# Patient Record
Sex: FEMALE | URBAN - METROPOLITAN AREA
[De-identification: names, ages, dates, MRNs, and addresses within clinical notes are randomized per-mention and may not be internally consistent; named-entity substitution may affect disease eponyms.]

---

## 2020-04-30 ENCOUNTER — AMBULATORY - HEALTHEAST (OUTPATIENT)
Dept: SURGERY | Facility: CLINIC | Age: 55
End: 2020-04-30

## 2020-04-30 DIAGNOSIS — Z11.59 ENCOUNTER FOR SCREENING FOR OTHER VIRAL DISEASES: ICD-10-CM

## 2021-07-03 NOTE — ADDENDUM NOTE
Addendum Note by Arcelia Beaver, RN at 4/30/2020  1:13 PM     Author: Arcelia Beaver RN Service: -- Author Type: Registered Nurse    Filed: 5/21/2020  1:36 PM Encounter Date: 4/30/2020 Status: Signed    : Arcelia Beaver RN (Registered Nurse)    Addended by: ARCELIA BEAVER on: 5/21/2020 01:36 PM        Modules accepted: Orders

## 2021-07-03 NOTE — ADDENDUM NOTE
Addendum Note by Arcelia Beaver, RN at 4/30/2020  1:13 PM     Author: Arcelia Beaver RN Service: -- Author Type: Registered Nurse    Filed: 5/22/2020 11:45 AM Encounter Date: 4/30/2020 Status: Signed    : Arcelia Beaver RN (Registered Nurse)    Addended by: ARCELIA BEAVER on: 5/22/2020 11:45 AM        Modules accepted: Orders

## 2024-08-21 ENCOUNTER — TRANSFERRED RECORDS (OUTPATIENT)
Dept: HEALTH INFORMATION MANAGEMENT | Facility: CLINIC | Age: 59
End: 2024-08-21

## 2025-02-19 ENCOUNTER — TRANSFERRED RECORDS (OUTPATIENT)
Dept: HEALTH INFORMATION MANAGEMENT | Facility: CLINIC | Age: 60
End: 2025-02-19

## 2025-04-21 ENCOUNTER — MEDICAL CORRESPONDENCE (OUTPATIENT)
Dept: HEALTH INFORMATION MANAGEMENT | Facility: CLINIC | Age: 60
End: 2025-04-21
Payer: MEDICAID

## 2025-04-24 ENCOUNTER — TRANSCRIBE ORDERS (OUTPATIENT)
Dept: OTHER | Age: 60
End: 2025-04-24

## 2025-04-24 DIAGNOSIS — B20 HUMAN IMMUNODEFICIENCY VIRUS (HIV) DISEASE (H): Primary | ICD-10-CM

## 2025-05-06 ENCOUNTER — TRANSCRIBE ORDERS (OUTPATIENT)
Dept: OTHER | Age: 60
End: 2025-05-06

## 2025-05-06 DIAGNOSIS — B20 HUMAN IMMUNODEFICIENCY VIRUS (HIV) DISEASE (H): Primary | ICD-10-CM

## 2025-05-29 ENCOUNTER — TELEPHONE (OUTPATIENT)
Dept: INFECTIOUS DISEASES | Facility: CLINIC | Age: 60
End: 2025-05-29
Payer: MEDICAID

## 2025-05-29 DIAGNOSIS — B20 HUMAN IMMUNODEFICIENCY VIRUS (H): Primary | ICD-10-CM

## 2025-05-29 NOTE — TELEPHONE ENCOUNTER
Entered orders for patient to schedule lab appointment prior to OV. Sent scheduling request to schedulers.

## 2025-06-03 ENCOUNTER — TELEPHONE (OUTPATIENT)
Dept: INFECTIOUS DISEASES | Facility: CLINIC | Age: 60
End: 2025-06-03
Payer: MEDICAID

## 2025-06-03 NOTE — TELEPHONE ENCOUNTER
JOSSELINE [facility] 6/3 to sched a NEW B20 visit with any B20 ID provider with labs 1 week prior to appt. Referral from Dr. Delmis Bravo MD.

## 2025-06-04 ENCOUNTER — TELEPHONE (OUTPATIENT)
Dept: INFECTIOUS DISEASES | Facility: CLINIC | Age: 60
End: 2025-06-04
Payer: MEDICAID

## 2025-06-04 NOTE — TELEPHONE ENCOUNTER
EP LVM 6/4 to sched a NEW B20 visit with any B20 ID provider with labs 1 week prior to appt. Referral from Dr. Delmis Bravo MD. --2nd attempt.

## 2025-06-29 ENCOUNTER — HEALTH MAINTENANCE LETTER (OUTPATIENT)
Age: 60
End: 2025-06-29

## 2025-07-02 ENCOUNTER — LAB (OUTPATIENT)
Dept: LAB | Facility: CLINIC | Age: 60
End: 2025-07-02
Payer: COMMERCIAL

## 2025-07-02 DIAGNOSIS — B20 HUMAN IMMUNODEFICIENCY VIRUS (H): ICD-10-CM

## 2025-07-02 LAB
ALBUMIN SERPL BCG-MCNC: 4 G/DL (ref 3.5–5.2)
ALP SERPL-CCNC: 103 U/L (ref 40–150)
ALT SERPL W P-5'-P-CCNC: 25 U/L (ref 0–50)
ANION GAP SERPL CALCULATED.3IONS-SCNC: 10 MMOL/L (ref 7–15)
AST SERPL W P-5'-P-CCNC: 26 U/L (ref 0–45)
BASOPHILS # BLD AUTO: 0 10E3/UL (ref 0–0.2)
BASOPHILS NFR BLD AUTO: 0 %
BILIRUB SERPL-MCNC: 0.2 MG/DL
BUN SERPL-MCNC: 17.5 MG/DL (ref 6–20)
CALCIUM SERPL-MCNC: 9.6 MG/DL (ref 8.8–10.4)
CD3 CELLS # BLD: 1832 CELLS/UL (ref 603–2990)
CD3 CELLS NFR BLD: 58 % (ref 49–84)
CD3+CD4+ CELLS # BLD: 1131 CELLS/UL (ref 441–2156)
CD3+CD4+ CELLS NFR BLD: 36 % (ref 28–63)
CD3+CD4+ CELLS/CD3+CD8+ CLL BLD: 1.69 % (ref 1.4–2.6)
CD3+CD8+ CELLS # BLD: 671 CELLS/UL (ref 125–1312)
CD3+CD8+ CELLS NFR BLD: 21 % (ref 10–40)
CHLORIDE SERPL-SCNC: 102 MMOL/L (ref 98–107)
CREAT SERPL-MCNC: 0.65 MG/DL (ref 0.51–0.95)
EGFRCR SERPLBLD CKD-EPI 2021: >90 ML/MIN/1.73M2
EOSINOPHIL # BLD AUTO: 0.2 10E3/UL (ref 0–0.7)
EOSINOPHIL NFR BLD AUTO: 2 %
ERYTHROCYTE [DISTWIDTH] IN BLOOD BY AUTOMATED COUNT: 13.8 % (ref 10–15)
GLUCOSE SERPL-MCNC: 73 MG/DL (ref 70–99)
HCO3 SERPL-SCNC: 27 MMOL/L (ref 22–29)
HCT VFR BLD AUTO: 39.2 % (ref 35–47)
HGB BLD-MCNC: 12.9 G/DL (ref 11.7–15.7)
IMM GRANULOCYTES # BLD: 0 10E3/UL
IMM GRANULOCYTES NFR BLD: 0 %
LYMPHOCYTES # BLD AUTO: 2.8 10E3/UL (ref 0.8–5.3)
LYMPHOCYTES NFR BLD AUTO: 30 %
MCH RBC QN AUTO: 31.8 PG (ref 26.5–33)
MCHC RBC AUTO-ENTMCNC: 32.9 G/DL (ref 31.5–36.5)
MCV RBC AUTO: 97 FL (ref 78–100)
MEV IGG SER IA-ACNC: 39.3 AU/ML
MEV IGG SER IA-ACNC: POSITIVE
MONOCYTES # BLD AUTO: 1.2 10E3/UL (ref 0–1.3)
MONOCYTES NFR BLD AUTO: 12 %
NEUTROPHILS # BLD AUTO: 5.2 10E3/UL (ref 1.6–8.3)
NEUTROPHILS NFR BLD AUTO: 56 %
PLATELET # BLD AUTO: 411 10E3/UL (ref 150–450)
POTASSIUM SERPL-SCNC: 4.5 MMOL/L (ref 3.4–5.3)
PROT SERPL-MCNC: 8.3 G/DL (ref 6.4–8.3)
RBC # BLD AUTO: 4.06 10E6/UL (ref 3.8–5.2)
RUBV IGG SERPL QL IA: 0.12 INDEX
RUBV IGG SERPL QL IA: NORMAL
SODIUM SERPL-SCNC: 139 MMOL/L (ref 135–145)
T CELL COMMENT: NORMAL
T PALLIDUM AB SER QL: NONREACTIVE
WBC # BLD AUTO: 9.4 10E3/UL (ref 4–11)

## 2025-07-02 PROCEDURE — 85025 COMPLETE CBC W/AUTO DIFF WBC: CPT

## 2025-07-02 PROCEDURE — 86762 RUBELLA ANTIBODY: CPT

## 2025-07-02 PROCEDURE — 86709 HEPATITIS A IGM ANTIBODY: CPT

## 2025-07-02 PROCEDURE — 86359 T CELLS TOTAL COUNT: CPT

## 2025-07-02 PROCEDURE — 80053 COMPREHEN METABOLIC PANEL: CPT

## 2025-07-02 PROCEDURE — 87591 N.GONORRHOEAE DNA AMP PROB: CPT

## 2025-07-02 PROCEDURE — 86644 CMV ANTIBODY: CPT

## 2025-07-02 PROCEDURE — 86696 HERPES SIMPLEX TYPE 2 TEST: CPT

## 2025-07-02 PROCEDURE — 86704 HEP B CORE ANTIBODY TOTAL: CPT

## 2025-07-02 PROCEDURE — 86645 CMV ANTIBODY IGM: CPT

## 2025-07-02 PROCEDURE — 87491 CHLMYD TRACH DNA AMP PROBE: CPT | Mod: 59

## 2025-07-02 PROCEDURE — 87340 HEPATITIS B SURFACE AG IA: CPT

## 2025-07-02 PROCEDURE — 87536 HIV-1 QUANT&REVRSE TRNSCRPJ: CPT

## 2025-07-02 PROCEDURE — 86708 HEPATITIS A ANTIBODY: CPT

## 2025-07-02 PROCEDURE — 36415 COLL VENOUS BLD VENIPUNCTURE: CPT

## 2025-07-02 PROCEDURE — 86765 RUBEOLA ANTIBODY: CPT

## 2025-07-02 PROCEDURE — 86735 MUMPS ANTIBODY: CPT

## 2025-07-02 PROCEDURE — 86360 T CELL ABSOLUTE COUNT/RATIO: CPT

## 2025-07-02 PROCEDURE — 86695 HERPES SIMPLEX TYPE 1 TEST: CPT

## 2025-07-02 PROCEDURE — 86780 TREPONEMA PALLIDUM: CPT

## 2025-07-03 LAB
C TRACH DNA SPEC QL PROBE+SIG AMP: NEGATIVE
C TRACH DNA SPEC QL PROBE+SIG AMP: NEGATIVE
CMV IGG SERPL IA-ACNC: >10 U/ML
CMV IGG SERPL IA-ACNC: ABNORMAL
CMV IGM SERPL IA-ACNC: 11.7 AU/ML
CMV IGM SERPL IA-ACNC: NEGATIVE
HAV AB SER QL IA: REACTIVE
HAV IGM SERPL QL IA: NONREACTIVE
HBV CORE AB SERPL QL IA: NONREACTIVE
HBV SURFACE AG SERPL QL IA: NONREACTIVE
HIV1 RNA # PLAS NAA DL=20: NOT DETECTED COPIES/ML
HSV1 IGG SERPL QL IA: 9.87 INDEX
HSV1 IGG SERPL QL IA: ABNORMAL
HSV2 IGG SERPL QL IA: 10.6 INDEX
HSV2 IGG SERPL QL IA: ABNORMAL
MUMPS ANTIBODY IGG INSTRUMENT VALUE: 25.1 AU/ML
MUV IGG SER QL IA: POSITIVE
N GONORRHOEA DNA SPEC QL NAA+PROBE: NEGATIVE
N GONORRHOEA DNA SPEC QL NAA+PROBE: NEGATIVE
SPECIMEN TYPE: NORMAL
SPECIMEN TYPE: NORMAL

## 2025-07-07 ENCOUNTER — TELEPHONE (OUTPATIENT)
Dept: INFECTIOUS DISEASES | Facility: CLINIC | Age: 60
End: 2025-07-07
Payer: COMMERCIAL

## 2025-07-07 NOTE — TELEPHONE ENCOUNTER
EP LVM 7/7 to let pt know that the 7/9 appt in [place] is cancelled and switched to [facility]; appt needed to be in [place].

## 2025-07-07 NOTE — CONFIDENTIAL NOTE
DIAGNOSIS:  Human immunodeficiency virus (HIV) disease    DATE RECEIVED:  07.09.2025    NOTES (Gather within 2 years) STATUS DETAILS   OFFICE NOTE from referring provider   Received 05.09.2025   Delmis Bravo APRN [place] [facility]    LABS (any labs) Internal / CE

## 2025-07-09 ENCOUNTER — PATIENT OUTREACH (OUTPATIENT)
Dept: INFECTIOUS DISEASES | Facility: CLINIC | Age: 60
End: 2025-07-09

## 2025-07-09 ENCOUNTER — PRE VISIT (OUTPATIENT)
Dept: INFECTIOUS DISEASES | Facility: CLINIC | Age: 60
End: 2025-07-09

## 2025-07-09 ENCOUNTER — OFFICE VISIT (OUTPATIENT)
Dept: INFECTIOUS DISEASES | Facility: CLINIC | Age: 60
End: 2025-07-09
Attending: STUDENT IN AN ORGANIZED HEALTH CARE EDUCATION/TRAINING PROGRAM
Payer: COMMERCIAL

## 2025-07-09 VITALS
OXYGEN SATURATION: 95 % | HEART RATE: 71 BPM | WEIGHT: 293 LBS | DIASTOLIC BLOOD PRESSURE: 84 MMHG | SYSTOLIC BLOOD PRESSURE: 135 MMHG

## 2025-07-09 DIAGNOSIS — E66.9 OBESITY, UNSPECIFIED CLASS, UNSPECIFIED OBESITY TYPE, UNSPECIFIED WHETHER SERIOUS COMORBIDITY PRESENT: ICD-10-CM

## 2025-07-09 DIAGNOSIS — B20 HUMAN IMMUNODEFICIENCY VIRUS (HIV) DISEASE (H): Primary | ICD-10-CM

## 2025-07-09 DIAGNOSIS — Z23 NEED FOR VACCINATION: ICD-10-CM

## 2025-07-09 PROCEDURE — 90750 HZV VACC RECOMBINANT IM: CPT | Performed by: INTERNAL MEDICINE

## 2025-07-09 PROCEDURE — 90471 IMMUNIZATION ADMIN: CPT | Performed by: INTERNAL MEDICINE

## 2025-07-09 PROCEDURE — 99213 OFFICE O/P EST LOW 20 MIN: CPT | Performed by: STUDENT IN AN ORGANIZED HEALTH CARE EDUCATION/TRAINING PROGRAM

## 2025-07-09 PROCEDURE — 250N000021 HC RX MED A9270 GY (STAT IND- M) 250: Performed by: INTERNAL MEDICINE

## 2025-07-09 RX ORDER — ELVITEGRAVIR, COBICISTAT, EMTRICITABINE, AND TENOFOVIR ALAFENAMIDE 150; 150; 200; 10 MG/1; MG/1; MG/1; MG/1
1 TABLET ORAL DAILY
Qty: 30 TABLET | Refills: 5 | Status: SHIPPED | OUTPATIENT
Start: 2025-07-09

## 2025-07-09 RX ORDER — ELVITEGRAVIR, COBICISTAT, EMTRICITABINE, AND TENOFOVIR ALAFENAMIDE 150; 150; 200; 10 MG/1; MG/1; MG/1; MG/1
1 TABLET ORAL DAILY
COMMUNITY
Start: 2025-05-28 | End: 2025-07-09

## 2025-07-09 RX ADMIN — ZOSTER VACCINE RECOMBINANT, ADJUVANTED 0.5 ML: KIT at 16:23

## 2025-07-09 ASSESSMENT — PAIN SCALES - GENERAL: PAINLEVEL_OUTOF10: NO PAIN (0)

## 2025-07-09 NOTE — PROGRESS NOTES
Infectious Disease / [facility]    New Outpatient Visit Consultation Note   Patient:  Cinda Morrison, Date of birth 1965, Medical record number [id number]     Assessment and Plan:     Impression: HIV well controlled on current regimen of Genvoya (for well more than a decade), nor other acute health concerns.  Would prefer to have her HIV followed at the [facility] going forward.    Plan today:   Continue current Genvoya ART, refills given for the next six months (to her [facility] pharmacy).  She will plan to follow-up with the [facility] group at the [facility] in about four months to further her ongoing HIV care into the future, because that is a much more accessible location for her (eight blocks from her home, and she does not drive) instead of having to come all the way to the [facility] for care.  I told her that if immediate concerns arise before her first intake appointment at [facility], she should feel free to contact our clinic.  Vaccine given today:   Second Shingrix dose.  Consider receiving a Prevnar 20 vaccination in the future -- received a Pneumovax 23 on 6/12/23.   Needs routine HCV screening in the future.  Consider a referral to [facility] in the future to further address her face rash.  Overweight status not addressed today.    Follow up:  Four months in [place].  (Scheduled today, and she was also given the phone number for that clinic.)    Signed:  Manish Donnelly MD, 07/09/2025     Discussed with ID attending Dr. Johns    [facility] Staff Attestation Note: Ms. Morrison was seen, examined, and the case was discussed with Dr. Donnelly, ID Fellow -- I agree with his consultative history and examination, assessment and plan in this outpatient ID / HIV Consult note. This note reflects my  observations and opinions and the plan outlined fully reflects my approach. I have reviewed the available history, radiology, laboratory results, and reports with the Fellow.        Discussion:     # HIV- 1 Infection  HIV  Aquired: 1996, diagnosed in 1998.  Medication History: Genvoya (elvitegravir-cobicistat-emtricitabine-tenofovir alafenamide )  Prior Resistance Mutations: None -- genotype pan-wildtype on 10/30/2011.    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  Noted she was off HIV therapy from 2002- 2011 (diagnosed 1998, on therapy until 2002, off therapy until 2011, on therapy consistently since 2011)    Co-morbid Infections  Hep B: No, needs Hep B  Hep C: No screen found.  TB Screening:Negative Quant Gold 7/2/25  CMV IgG positive, HSV1 and 2 IgG +  Mumps immune, rubella nonimmune, measles immune  Toxoplasma:  Screening not available.    Today's Plan:  - Continue ART, monitor labs as above  - Doing well on therapy, no changes.    # Preventative Medicine  Sexually Transmitted Infection Risk and Screening: Negative STI testing 7/2/2025                       Gonorrhea and Chlamydia: - as above                       Syphilis: negative 7/2/2025     Immunizations:  COVID-19: up to date  Influenza:Discussed Seasonal Vaccine  HPV: aged out  Hepatitis B:  Hep B given in 2014 for 3 doses  Tdap: up to date  PCV 20 Received Pneumovax 23 on 6/12/23 following prior Prevnar 13 -- would recommend Prevnar 20 in the future, to be complete.  Meningococcus: Men ACYW given 9/2/20  Hepatitis A: seropositve    Renal Health: Monitor periodically to ensure no need to change regimen   Creatinine   Date Value Ref Range Status   07/02/2025 0.65 0.51 - 0.95 mg/dL Final         HPI:      Cinda Morrison is a very pleasant 59 year old patient seeing me today primarily  for transfer of stable HIV care.  She was hospitalized with AIDS at [facility] in 2011 and has (per her report) been on Genvoya since then with consistently undetectable HIV plasma viral loads, followed by Dr. Harris at [facility] ([facility]) for the past 13+ years.  She last was seen there on 2/19/25 and had routine HIV monitoring laboratory studies in 10/2024.  That HIV care program at [facility] is now being phased out and patients are being referred to other clinics in the [facility].  She was referred here to the [facility] for ongoing care, but she says she lives about eight blocks from [facility] in [place] and does not own a car, so, for ease of transportation, she would much rather have her ongoing care at the [facility] (which is staffed by ID physicians of [facility]).  She tells me she has been very consistent in taking Genvoya daily in recent years and tolerated it without perceived side effects.  She has had a mild bilateral facial rash in recent years for which she was prescribed a topical cream, but she found that the prescription was not beneficial and the rash is well-controlled with application of OTC moisturizing lotions alone.  Beyond that, she says she has felt stable well in recent months and does not believe she has any other current health issues.  She is overweight, but did not raise that as a concern today -- she has been on Wegovy in the recent past.  She had a significantly unpleasant bout of right T ~ 12 dermatomal shingles about four years ago and would like to not experience that again, so she would like to receive the second dose of her Shingrix series today.  She has bilateral (left >> right) chronic knee arthralgias that impede her ambulation.  Beyond that, she lacks other complaints today, including no recent febrile or EENT symptoms, cough, dyspnea,  chest pain, GI or  symptoms, skin concerns, or neurological symptoms.       Other Medical History:     An attempt was made to collect past medical surgical, family and social history during this encounter,  this information was reviewed with the patient and updated, documented where most relevant in the HPI    See the 2/19/25 [facility] progress note by Dr. Harris in the Media section.       Physical Exam:   Vital signs:  /84   Pulse 71   Wt (!) 149.2 kg (329 lb)   SpO2 95%   General:  A pleasant, conversant, obese, WDWN 60 yo woman in NAD.  Eyes:     Extraocular eye movements grossly intact, no ptosis, no discharge, no scleral icterus.  Mouth, Throat:     Mucous membranes moist  Cardiovascular:    S1, S2 normal, regular rate and rhythm.  Respiratory:     Inspection: Not in respiratory distress, chest expansion symmetrical.   Auscultation: 4 point auscultation done clear to auscultation bilaterally  Gastrointestinal:  Soft, non-tender; bowel sounds normal; no palpable masses.  Musculoskeletal:     No major deformity  Neurologic:     Higher Mental Function: Conversant, AOx4   Motor: Moving all 4 limbs, walks haltingly with aid of a cane.  Psychiatric: Appropriate  Skin:  Anicteric     MDM     No visits with results within 1 Week(s) from this visit.   Latest known visit with results is:   Lab on 07/02/2025   Component Date Value Ref Range Status    CMV [facility] IgG Instrument Value 07/02/2025 >10.00 (H)  <0.60 U/mL Final    CMV Antibody IgG 07/02/2025 Positive, suggests recent or past exposure. (A)  No detectable antibody.  Final    CMV [facility] IgM Instrument Value 07/02/2025 11.7  <30.0 AU/mL Final    CMV Antibody IgM 07/02/2025 Negative  Negative Final    Sodium 07/02/2025 139  135 - 145 mmol/L Final    Potassium 07/02/2025 4.5  3.4 - 5.3 mmol/L Final    Carbon Dioxide (CO2) 07/02/2025 27  22 - 29 mmol/L Final    Anion Gap 07/02/2025 10  7 - 15 mmol/L Final    Urea Nitrogen 07/02/2025 17.5  6.0 - 20.0  mg/dL Final    Creatinine 07/02/2025 0.65  0.51 - 0.95 mg/dL Final    GFR Estimate 07/02/2025 >90  >60 mL/min/1.73m2 Final    eGFR calculated using 2021 CKD-EPI equation.    Calcium 07/02/2025 9.6  8.8 - 10.4 mg/dL Final    Chloride 07/02/2025 102  98 - 107 mmol/L Final    Glucose 07/02/2025 73  70 - 99 mg/dL Final    Alkaline Phosphatase 07/02/2025 103  40 - 150 U/L Final    AST 07/02/2025 26  0 - 45 U/L Final    ALT 07/02/2025 25  0 - 50 U/L Final    Protein Total 07/02/2025 8.3  6.4 - 8.3 g/dL Final    Albumin 07/02/2025 4.0  3.5 - 5.2 g/dL Final    Bilirubin Total 07/02/2025 0.2  <=1.2 mg/dL Final    Hepatitis A Antibody IgM 07/02/2025 Nonreactive  Nonreactive Final    Nonreactive results indicate either inadequate or delayed anti-HAV IgM response after known exposure to HAV or absence of acute or recent hepatitis A.    Assay performance characteristics have not been established for immunocompromised or immunosuppressed patients.    Hepatitis A Antibody Total 07/02/2025 Reactive   Final    This assay detects the presence of anti-hepatitis A virus (anti-HAV) total (both IgG and IgM combined). If clinically indicated, specific testing for anti-HAV IgM (HAVM / Hepatitis A IgM Antibody, Serum) is necessary to confirm the presence of acute or recent hepatitis A. Please see interpretation guide below.    Assay performance characteristics have not been established for immunocompromised or immunosuppressed patients.    Hepatitis B Core Antibody Total 07/02/2025 Nonreactive  Nonreactive Final    Nonreactive hepatitis B core antibody test results indicate the absence of exposure to hepatitis B virus and no evidence of recent, past/resolved, or chronic hepatitis B.     Assay performance characteristics have not been established in patients under 21, pregnant women, or in populations of immunocompromised or immunosuppressed patients.    Hepatitis B Surface Antigen 07/02/2025 Nonreactive  Nonreactive Final      Assay  performance characteristics have not been established for testing of newborns.    HSV Type 1 IgG Instrument Value 07/02/2025 9.87 (H)  <0.90 Index Final    Herpes Simplex Virus Type 1 IgG An* 07/02/2025 Positive.  IgG antibody to HSV-1 detected. (A)  No HSV-1 IgG antibodies detected Final    HSV Type 2 IgG Instrument Value 07/02/2025 10.60 (H)  <0.90 Index Final    Herpes Simplex Virus Type 2 IgG An* 07/02/2025 Positive.  IgG antibody to HSV-2 detected. (A)  No HSV-2 IgG antibodies detected Final    HIV-1 RNA copies/mL 07/02/2025 Not Detected  Not Detected Copies/mL Final    Mumps [facility] IgG Instrument Value 07/02/2025 25.1  <9.0 AU/mL Final    Mumps Antibody IgG 07/02/2025 Positive   Final    Suggests previous exposure or immunization and probable immunity.    Rubella [facility] IgG Instrument Value 07/02/2025 0.12  <0.90 Index Final    Rubella Antibody IgG 07/02/2025 No detectable antibody.   Final    Rubeola (Measles) Emer[facility] IgG Instrume* 07/02/2025 39.3  <13.5 AU/mL Final    Rubeola (Measles) Antibody IgG 07/02/2025 Positive   Final    Suggests previous exposure or immunization and probable immunity.    CD3% Total T Cells 07/02/2025 58  49 - 84 % Final    Absolute CD3, Total T Cells 07/02/2025 1,832  603 - 2,990 cells/uL Final    CD4% [place] T Cells 07/02/2025 36  28 - 63 % Final    Absolute CD4, [place] T Cells 07/02/2025 1,131  441 - 2,156 cells/uL Final    CD8% Suppressor T Cells 07/02/2025 21  10 - 40 % Final    Absolute CD8, Suppressor T Cells 07/02/2025 671  125 - 1,312 cells/uL Final    CD4:CD8 Ratio 07/02/2025 1.69  1.40 - 2.60 Final    Treponema Antibody Total 07/02/2025 Nonreactive  Nonreactive Final    WBC Count 07/02/2025 9.4  4.0 - 11.0 10e3/uL Final    RBC Count 07/02/2025 4.06  3.80 - 5.20 10e6/uL Final    Hemoglobin 07/02/2025 12.9  11.7 - 15.7 g/dL Final    Hematocrit 07/02/2025 39.2  35.0 - 47.0 % Final    MCV 07/02/2025 97  78 - 100 fL Final    MCH 07/02/2025 31.8  26.5 - 33.0 pg Final    MCHC 07/02/2025  32.9  31.5 - 36.5 g/dL Final    RDW 07/02/2025 13.8  10.0 - 15.0 % Final    Platelet Count 07/02/2025 411  150 - 450 10e3/uL Final    % Neutrophils 07/02/2025 56  % Final    % Lymphocytes 07/02/2025 30  % Final    % Monocytes 07/02/2025 12  % Final    % Eosinophils 07/02/2025 2  % Final    % Basophils 07/02/2025 0  % Final    % Immature Granulocytes 07/02/2025 0  % Final    Absolute Neutrophils 07/02/2025 5.2  1.6 - 8.3 10e3/uL Final    Absolute Lymphocytes 07/02/2025 2.8  0.8 - 5.3 10e3/uL Final    Absolute Monocytes 07/02/2025 1.2  0.0 - 1.3 10e3/uL Final    Absolute Eosinophils 07/02/2025 0.2  0.0 - 0.7 10e3/uL Final    Absolute Basophils 07/02/2025 0.0  0.0 - 0.2 10e3/uL Final    Absolute Immature Granulocytes 07/02/2025 0.0  <=0.4 10e3/uL Final    Chlamydia Trachomatis 07/02/2025 Negative  Negative Final    Negative for C. trachomatis rRNA by transcription mediated amplification.   A negative result by transcription mediated amplification does not preclude the presence of infection because results are dependent on proper and adequate collection, absence of inhibitors and sufficient rRNA to be detected.    Neisseria gonorrhoeae 07/02/2025 Negative  Negative Final    Negative for N. gonorrhoeae rRNA by transcription mediated amplification. A negative result by transcription mediated amplification does not preclude the presence of C. trachomatis infection because results are dependent on proper and adequate collection, absence of inhibitors and sufficient rRNA to be detected.    CTNG Specimen Source 07/02/2025 Urine, Voided   Final    Chlamydia Trachomatis 07/02/2025 Negative  Negative Final    Negative for C. trachomatis rRNA by transcription mediated amplification.   A negative result by transcription mediated amplification does not preclude the presence of infection because results are dependent on proper and adequate collection, absence of inhibitors and sufficient rRNA to be detected.    Neisseria gonorrhoeae  07/02/2025 Negative  Negative Final    Negative for N. gonorrhoeae rRNA by transcription mediated amplification. A negative result by transcription mediated amplification does not preclude the presence of C. trachomatis infection because results are dependent on proper and adequate collection, absence of inhibitors and sufficient rRNA to be detected.    CTNG Specimen Source 07/02/2025 Throat   Final    Quantiferon Nil Tube 07/02/2025 0.22  IU/mL Final    Quantiferon TB1 Tube 07/02/2025 0.38  IU/mL Final    Quantiferon TB2 Tube 07/02/2025 0.26   Final    Quantiferon Mitogen 07/02/2025 10.00  IU/mL Final    Quantiferon-TB Gold Plus 07/02/2025 Negative  Negative Final    No interferon gamma response to M.tuberculosis antigens was detected. Infection with M.tuberculosis is unlikely, however a single negative result does not exclude infection. In patients at high risk for infection, a second test should be considered in accordance with the 2017 ATS/IDSA/CDC Clinical Pract  ice Guidelines for Diagnosis of Tuberculosis in Adults and Children     TB1 Ag minus Nil Value 07/02/2025 0.16  IU/mL Final    TB2 Ag minus Nil Value 07/02/2025 0.04  IU/mL Final    Mitogen minus Nil Result 07/02/2025 9.78  IU/mL Final    Nil Result 07/02/2025 0.22  IU/mL Final

## 2025-07-09 NOTE — NURSING NOTE
Chief Complaint   Patient presents with    Consult       Vital signs:      BP: 135/84 Pulse: 71     SpO2: 95 %       Weight: (!) 149.2 kg (329 lb)  There is no height or weight on file to calculate BMI.      Sandra Castrejon CMA   7/9/2025 3:40 PM

## 2025-07-09 NOTE — Clinical Note
"7/9/2025       RE: Cinda Morrison  [address]  [place] [address]     Dear Colleague,    Thank you for referring your patient, Cinda Morrison, to the [facility] [place] at [facility]. Please see a copy of my visit note below.       Infectious Disease     Outpatient Visit Note   Patient:  Cinda Morrison, Date of birth 1965, Medical record number [id number]     Assessment and Plan:     Impression: HIV well controlled on current regimen, other health maintenance addressed    Plan today:   Continue current ART, refills given  ***  Vaccines discussed: Prevnar 20,     Follow up ***    Signed:  Manish Donnelly MD, 07/09/2025     Discussed with ID attending Dr. Johns       Discussion:     # HIV- 1 Infection  HIV  Aquired: 1996, diagnosed in 1998.   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  Noted she was off HIV therapy since 2002 at that time (diagnosed 1998, on therapy until 2002, off therapy until 2011, presumably on therapy since 2011***)    Co-morbid Infections  Hep B: No, needs Hep B  Hep C: {Blank single:66872::\"No\",\"Yes\",\"prior treatment\"}  TB Screening:Negative Quant Gold 7/2/25  CMV IgG positive, HSV1 and 2 IgG +  Mumps immune, rubella nonimmune, measles immune    Today's Plan:  - Continue ART, monitor labs as above  - Doing well on therapy, no changes.    # Preventative Medicine  Sexually Transmitted Infection Risk and Screening: Negative STI testing 7/2/2025                       Gonorrhea and Chlamydia: - as above                       Syphilis: negative 7/2/2025     Immunizations:  COVID-19: up " "to date  Influenza:Discussed Seasonal Vaccine  HPV: aged out  Hepatitis B:  Hep B given in 2014 for 3 doses  Tdap: up to date  PCV 20 {Blank single:19161::\"due\",\"up to date\",\" recommended, will start today\",\"recommended, declined\",\"patient wished to address at a later time\"}  Meningococcus: Men ACYW given 9/2/20  Hepatitis A: seropositve    Renal Health: Monitor periodically to ensure no need to change regimen   Creatinine   Date Value Ref Range Status   07/02/2025 0.65 0.51 - 0.95 mg/dL Final           HPI:      Cinda Morrison is a 59 year old patient seeing me today primarily for HIV care.    ***       Other Medical History:     An attempt was made to collect past medical surgical, family and social history during this encounter,  this information was reviewed with the patient and updated, documented where most relevant in the HPI       Physical Exam:     There were no vitals taken for this visit.    GENERAL APPEARANCE: Not in acute distress    PHYSICAL EXAM:  Eyes:     Extraocular eye movements grossly intact, no ptosis, no discharge, no scleral icterus.  Mouth, Throat:     Mucous membranes moist  Cardiovascular:    S1, S2 normal, regular rate and rhythm.  Respiratory:     Inspection: Not in respiratory distress, chest expansion symmetrical.   Auscultation: 4 point auscultation done clear to auscultation bilaterally  Gastrointestinal:  Soft, non-tender; bowel sounds normal; no palpable masses.  Musculoskeletal:     No major deformity  Neurologic:     Higher Mental Function: Conversant, AOx4   Motor: Moving all 4 limbs  Psychiatric: Appropriate  Skin:  Anicteric     MDM   LABS  Absolute CD4, [place] T Cells   Date Value Ref Range Status   07/02/2025 1,131 441 - 2,156 cells/uL Final     HIV-1 RNA copies/mL   Date Value Ref Range Status   07/02/2025 Not Detected Not Detected Copies/mL Final     Treponema Antibody Total   Date Value Ref Range Status   07/02/2025 Nonreactive Nonreactive Final     Hepatitis B Surface " Antigen   Date Value Ref Range Status   07/02/2025 Nonreactive Nonreactive Final     Comment:       Assay performance characteristics have not been established for testing of newborns.        Manish Donnelly MD                Infectious Disease / [facility] (HIV) [facility]    New Outpatient Visit Consultation Note   Patient:  Cinda Morrison, Date of birth 1965, Medical record number [id number]     Assessment and Plan:     Impression: HIV well controlled on current regimen of Genvoya (for well more than a decade), nor other acute health concerns.  Would prefer to have her HIV followed at the [facility] going forward.    Plan today:   Continue current Genvoya ART, refills given for the next six months (to her [facility] pharmacy).  She will plan to follow-up with the [facility] at the [facility] in about four months to further her ongoing HIV care into the future, because that is a much more accessible location for her (eight blocks from her home, and she does not drive) instead of having to come all the way to the [facility] for care.  I told her that if immediate concerns arise before her first intake appointment at [facility], she should feel free to contact our clinic.  Vaccine given today:   Second Shingrix dose.  Consider receiving a Prevnar 20 vaccination in the future -- received a Pneumovax 23 on 6/12/23.   Needs routine HCV screening in the future.  Overweight status not addressed today.    Follow up:  Four months in [place].  (Scheduled today, and she was also given the phone number for that clinic.)    Signed:  Manish Donnelly MD, 07/09/2025     Discussed with ID attending Dr. Johns    [facility] Staff Attestation Note: Ms. Morrison was seen, examined, and the case was discussed with Dr. Donnelly, ID Fellow -- I agree with his consultative  history and examination, assessment and plan in this outpatient ID / HIV Consult note. This note reflects my observations and opinions and the plan outlined fully reflects my approach. I have reviewed the available history, radiology, laboratory results, and reports with the Fellow.        Discussion:     # HIV- 1 Infection  HIV  Aquired: 1996, diagnosed in 1998.  Medication History: Genvoya (elvitegravir-cobicistat-emtricitabine-tenofovir alafenamide )  Prior Resistance Mutations: None -- genotype pan-wildtype on 10/30/2011.    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  Noted she was off HIV therapy from 2002- 2011 (diagnosed 1998, on therapy until 2002, off therapy until 2011, on therapy consistently since 2011)    Co-morbid Infections  Hep B: No, needs Hep B  Hep C: No screen found.  TB Screening:Negative Quant Gold 7/2/25  CMV IgG positive, HSV1 and 2 IgG +  Mumps immune, rubella nonimmune, measles immune  Toxoplasma:  Screening not available.    Today's Plan:  - Continue ART, monitor labs as above  - Doing well on therapy, no changes.    # Preventative Medicine  Sexually Transmitted Infection Risk and Screening: Negative STI testing 7/2/2025                       Gonorrhea and Chlamydia: - as above                       Syphilis: negative 7/2/2025     Immunizations:  COVID-19: up to date  Influenza:Discussed Seasonal Vaccine  HPV: aged out  Hepatitis B:  Hep B given in 2014 for 3 doses  Tdap: up to date  PCV 20 Received Pneumovax 23 on 6/12/23 following prior Prevnar 13 -- would recommend Prevnar 20 in the future, to be complete.  Meningococcus: Men ACYW given 9/2/20  Hepatitis A: seropositve    Renal Health: Monitor periodically to ensure no need to change regimen   Creatinine   Date Value Ref Range Status   07/02/2025 0.65 0.51 - 0.95 mg/dL  Final         HPI:      Cinda Morrison is a very pleasant 59 year old patient seeing me today primarily for transfer of stable HIV care.  She was hospitalized with AIDS at [facility] in 2011 and has (per her report) been on Genvoya since then with consistently undetectable HIV plasma viral loads, followed by Dr. Harris at [facility] for the past 13+ years.  That HIV care program at [facility] is now being phased out and patients are being referred to other clinics in the [facility].  She was referred here to the [facility] for ongoing care, but she says she lives about eight blocks from [facility] in [place] and does not own a car, so, for ease of transportation, she would much rather have her ongoing care at the [facility] (which is staffed by ID physicians of [facility]).    ***       Other Medical History:     An attempt was made to collect past medical surgical, family and social history during this encounter,  this information was reviewed with the patient and updated, documented where most relevant in the HPI       Physical Exam:     There were no vitals taken for this visit.    GENERAL APPEARANCE: Not in acute distress    PHYSICAL EXAM:  Eyes:     Extraocular eye movements grossly intact, no ptosis, no discharge, no scleral icterus.  Mouth, Throat:     Mucous membranes moist  Cardiovascular:    S1, S2 normal, regular rate and rhythm.  Respiratory:     Inspection: Not in respiratory distress, chest expansion symmetrical.   Auscultation: 4 point auscultation done clear to auscultation bilaterally  Gastrointestinal:  Soft, non-tender; bowel sounds normal; no palpable masses.  Musculoskeletal:     No major deformity  Neurologic:     Higher Mental Function: Conversant, AOx4   Motor: Moving all 4 limbs  Psychiatric: Appropriate  Skin:  Anicteric     MDM   LABS  Absolute CD4, [place]  T Cells   Date Value Ref Range Status   07/02/2025 1,131 441 - 2,156 cells/uL Final     HIV-1 RNA copies/mL   Date Value Ref Range Status   07/02/2025 Not Detected Not Detected Copies/mL Final     Treponema Antibody Total   Date Value Ref Range Status   07/02/2025 Nonreactive Nonreactive Final     Hepatitis B Surface Antigen   Date Value Ref Range Status   07/02/2025 Nonreactive Nonreactive Final     Comment:       Assay performance characteristics have not been established for testing of newborns.        Manish Donnelly MD               Again, thank you for allowing me to participate in the care of your patient.      Sincerely,    Manish Donnelly MD

## 2025-07-09 NOTE — TELEPHONE ENCOUNTER
Social Work ID Assessment      Living Situation    Address: [address] [place] [address] [address]    [place] [address]     Permanent Living Situation: Yes         Communication    Telephone (2 numbers preferably): [phone]     [facility] account: Active account with recent log in, knows how to navigate, no questions.          Transportation    Transportation Mode: Does not drive,  drives her to appointments or she can take the bus     Transportation Resources available: n/a         Family/Support Network    Social Supports:     Professional Supports/Services: Patient previously had case management at [facility]. Spoke about case management services through [employer]. Provided Case Management contact information to Patient.     /Care Coordinator, if applicable: not at this time    Relationship Status:          Insurance/Financial/Legal Info    Insurance: [employer]    *also on [facility]     Financial Concerns: Has programs to assist including [employer] for premium costs and [employer] for grocery giftcard monthly.     How does the patient describe their current finances? Patient shared her current supports, did not report current concerns.     Employment: Works as [employer]     Advanced Care Planning: No ACP documents in chart     Decision Making: Self         Mental Health/Substance Use     Mental Health: Did not discuss today    Substance Use: Did not discuss today          Patient Identified Strengths:    Patient has support from her spouse who brought her to the appointment today. Patient has services through [employer] (food assistance, giftcard through [employer]) and plans to pursue case management through them as well. Patient has [facility] assistance and works with Eusebia Garcia.     Patient Identified Service/Resource needs:     Patient was Patient at [facility] and needs to establish new ID Provider. Is scheduled with Dr. Donnelly today, 7/9. Patient previously had case management through [facility], will establish with [facility].     Patient Goals:     Establish case management through [facility].    Patient would like to transfer care to ID in [place] as it is closer to her home.         MICKEY Case, [facility]  Infectious Disease